# Patient Record
Sex: FEMALE | Race: WHITE | NOT HISPANIC OR LATINO
[De-identification: names, ages, dates, MRNs, and addresses within clinical notes are randomized per-mention and may not be internally consistent; named-entity substitution may affect disease eponyms.]

---

## 2024-05-03 ENCOUNTER — APPOINTMENT (OUTPATIENT)
Dept: OTOLARYNGOLOGY | Facility: CLINIC | Age: 44
End: 2024-05-03
Payer: COMMERCIAL

## 2024-05-03 VITALS
BODY MASS INDEX: 23.74 KG/M2 | WEIGHT: 134 LBS | DIASTOLIC BLOOD PRESSURE: 72 MMHG | OXYGEN SATURATION: 100 % | HEIGHT: 63 IN | SYSTOLIC BLOOD PRESSURE: 112 MMHG | HEART RATE: 90 BPM

## 2024-05-03 DIAGNOSIS — J34.89 OTHER SPECIFIED DISORDERS OF NOSE AND NASAL SINUSES: ICD-10-CM

## 2024-05-03 DIAGNOSIS — R59.0 LOCALIZED ENLARGED LYMPH NODES: ICD-10-CM

## 2024-05-03 DIAGNOSIS — J30.9 ALLERGIC RHINITIS, UNSPECIFIED: ICD-10-CM

## 2024-05-03 PROBLEM — Z00.00 ENCOUNTER FOR PREVENTIVE HEALTH EXAMINATION: Status: ACTIVE | Noted: 2024-05-03

## 2024-05-03 PROCEDURE — 31575 DIAGNOSTIC LARYNGOSCOPY: CPT

## 2024-05-03 PROCEDURE — 31231 NASAL ENDOSCOPY DX: CPT | Mod: 59

## 2024-05-03 PROCEDURE — 99204 OFFICE O/P NEW MOD 45 MIN: CPT | Mod: 25

## 2024-05-03 RX ORDER — FLUTICASONE PROPIONATE 50 UG/1
50 SPRAY, METERED NASAL DAILY
Qty: 3 | Refills: 1 | Status: ACTIVE | COMMUNITY
Start: 2024-05-03 | End: 1900-01-01

## 2024-05-03 NOTE — ASSESSMENT
[FreeTextEntry1] : - 5/3/24: 44 y/o F with history of Hashimoto's presents with concern for right pre-auricular lesion for the past 5 months. She reports it periodically increases and decreases in size. When she saw her internist in January she was advised to use Nettipot and take Advil and after treatment it decreased. She complains of discomfort which she would rate a 3 out of 10 in severity. On physical exam she was found to have a palpable lesion in right pre-auricular area. I am recommending ultrasound of head and neck and to follow up after in 1 month to review.   She also complains of intermittent rhinorrhea for the past 5 months. She has left nasal pain, but attributes this to acne. On physical exam she was found to have inflammation consistent with allergic rhinitis. I am recommending nasal saline rinses and nasal steroids.    -ultrasound head + neck -nasal saline rinses and nasal steroids -Followup after above in 1 month to reivew

## 2024-05-03 NOTE — PHYSICAL EXAM
[Normal] : mucosa is normal [Midline] : trachea located in midline position [de-identified] : r preauricular lymph node palpable- soft, mildly tender

## 2024-05-03 NOTE — PROCEDURE
[FreeTextEntry6] : -  Pre-operative Diagnosis: Rhinorrhea Post-operative Diagnosis: inflammation consistent with allergic rhinitis  Anesthesia: Topical Procedure: Bilateral nasal endoscopy Procedure Details:   After topical anesthesia and decongestant, the patient was placed in the supine position. The telescope was passed along the left nasal floor to the nasopharynx. It was then passed into the region of the middle meatus, middle turbinate, and the sphenoethmoid region. An identical procedure was performed on the right side.   Findings: Mucosa:   inflammation consistent with allergic rhinitis  Nasal septum: normal Discharge:  none Turbinates:  normal Adenoid:   normal Posterior choanae:  normal Eustachian tubes:  normal  Mucous stranding:  normal   Lesions:   Not present   Comments:   Condition: Stable. Patient tolerated procedure well. [de-identified] : -   Pre-operative Diagnosis: Head/ neck exam Post-operative Diagnosis: Normal  Anesthesia: Topical - 1 % Lidocaine/Phenylephrine Procedure: Flexible Laryngoscopy   Procedure Details: The patient was placed in the sitting position. After decongestant and anesthesia were applied the laryngoscope was passed. The nasal cavities, nasopharynx, oropharynx, hypopharynx, and larynx were all examined. Vocal folds were examined during respiration and phonation. The following findings were noted:   Findings: Nose: Septum is midline, turbinates are normal, nasal airways patent, mucosa normal Nasopharynx: Adenoids normal, no masses, eustachian tube normal Oropharynx: Pharyngeal walls symmetric and without lesion. Tonsils/fossae symmetric and normal. Hypopharynx: Hypopharynx and pyriform sinuses without lesion. No masses or asymmetry. No pooling of secretions. Larynx: Epiglottis and aryepiglottic folds were sharp and crisp bilaterally. Bilateral false and true vocal folds normal appearance. Bilateral vocal folds fully mobile and symmetric. Airway was widely patent.   Condition: Stable. Patient tolerated procedure well.   Complications: None

## 2024-05-03 NOTE — HISTORY OF PRESENT ILLNESS
[de-identified] : 5/3/24: 42 y/o F with history of Hashimoto's presents with concern for right pre-auricular lesion for the past 5 months. She reports it periodically increases and decreases in size. When she saw her internist in January, she was advised to use Netti pot and take Advil and after treatment it decreased. She complains of discomfort which she would rate a 3 out of 10 in severity. No fevers, chills, night sweats, or intentional weight loss. She denies hearing loss, tinnitus, otorrhea, vertiginous symptoms. +qtips. -bruxism  She also complains of intermittent rhinorrhea for the past 5 months. She has left nasal pain, but attributes this to acne. No changes in smell / taste or postnasal drip. For treatments she has used Netti Pot which she finds helpful.

## 2024-05-08 ENCOUNTER — APPOINTMENT (OUTPATIENT)
Dept: ULTRASOUND IMAGING | Facility: CLINIC | Age: 44
End: 2024-05-08

## 2024-05-10 ENCOUNTER — APPOINTMENT (OUTPATIENT)
Dept: ULTRASOUND IMAGING | Facility: CLINIC | Age: 44
End: 2024-05-10
Payer: COMMERCIAL

## 2024-05-10 ENCOUNTER — RESULT REVIEW (OUTPATIENT)
Age: 44
End: 2024-05-10

## 2024-05-10 ENCOUNTER — OUTPATIENT (OUTPATIENT)
Dept: OUTPATIENT SERVICES | Facility: HOSPITAL | Age: 44
LOS: 1 days | End: 2024-05-10

## 2024-05-10 PROCEDURE — 76536 US EXAM OF HEAD AND NECK: CPT | Mod: 26

## 2024-05-31 ENCOUNTER — APPOINTMENT (OUTPATIENT)
Dept: OTOLARYNGOLOGY | Facility: CLINIC | Age: 44
End: 2024-05-31

## 2024-05-31 NOTE — PROCEDURE
[FreeTextEntry6] : -  Pre-operative Diagnosis: Rhinorrhea Post-operative Diagnosis: inflammation consistent with allergic rhinitis  Anesthesia: Topical Procedure: Bilateral nasal endoscopy Procedure Details:   After topical anesthesia and decongestant, the patient was placed in the supine position. The telescope was passed along the left nasal floor to the nasopharynx. It was then passed into the region of the middle meatus, middle turbinate, and the sphenoethmoid region. An identical procedure was performed on the right side.   Findings: Mucosa:   inflammation consistent with allergic rhinitis  Nasal septum: normal Discharge:  none Turbinates:  normal Adenoid:   normal Posterior choanae:  normal Eustachian tubes:  normal  Mucous stranding:  normal   Lesions:   Not present   Comments:   Condition: Stable. Patient tolerated procedure well. [de-identified] : -   Pre-operative Diagnosis: Head/ neck exam Post-operative Diagnosis: Normal  Anesthesia: Topical - 1 % Lidocaine/Phenylephrine Procedure: Flexible Laryngoscopy   Procedure Details: The patient was placed in the sitting position. After decongestant and anesthesia were applied the laryngoscope was passed. The nasal cavities, nasopharynx, oropharynx, hypopharynx, and larynx were all examined. Vocal folds were examined during respiration and phonation. The following findings were noted:   Findings: Nose: Septum is midline, turbinates are normal, nasal airways patent, mucosa normal Nasopharynx: Adenoids normal, no masses, eustachian tube normal Oropharynx: Pharyngeal walls symmetric and without lesion. Tonsils/fossae symmetric and normal. Hypopharynx: Hypopharynx and pyriform sinuses without lesion. No masses or asymmetry. No pooling of secretions. Larynx: Epiglottis and aryepiglottic folds were sharp and crisp bilaterally. Bilateral false and true vocal folds normal appearance. Bilateral vocal folds fully mobile and symmetric. Airway was widely patent.   Condition: Stable. Patient tolerated procedure well.   Complications: None

## 2024-05-31 NOTE — ASSESSMENT
[FreeTextEntry1] : - 5/3/24: 44 y/o F with history of Hashimoto's presents with concern for right pre-auricular lesion for the past 5 months. She reports it periodically increases and decreases in size. When she saw her internist in January she was advised to use Nettipot and take Advil and after treatment it decreased. She complains of discomfort which she would rate a 3 out of 10 in severity. On physical exam she was found to have a palpable lesion in right pre-auricular area. I am recommending ultrasound of head and neck and to follow up after in 1 month to review.   She also complains of intermittent rhinorrhea for the past 5 months. She has left nasal pain, but attributes this to acne. On physical exam she was found to have inflammation consistent with allergic rhinitis. I am recommending nasal saline rinses and nasal steroids.  5/31/24: Ultrasound head and neck consistent with normal -appearing lymph node.     -ultrasound head + neck -nasal saline rinses and nasal steroids -Followup after above in 1 month to reivew

## 2024-05-31 NOTE — HISTORY OF PRESENT ILLNESS
[de-identified] : 5/3/24: 44 y/o F with history of Hashimoto's presents with concern for right pre-auricular lesion for the past 5 months. She reports it periodically increases and decreases in size. When she saw her internist in January, she was advised to use Netti pot and take Advil and after treatment it decreased. She complains of discomfort which she would rate a 3 out of 10 in severity. No fevers, chills, night sweats, or intentional weight loss. She denies hearing loss, tinnitus, otorrhea, vertiginous symptoms. +qtips. -bruxism  She also complains of intermittent rhinorrhea for the past 5 months. She has left nasal pain, but attributes this to acne. No changes in smell / taste or postnasal drip. For treatments she has used Netti Pot which she finds helpful.  - [FreeTextEntry1] : 5/31/24: Patient completed US head and neck and presents to review those results.

## 2024-07-19 ENCOUNTER — APPOINTMENT (OUTPATIENT)
Dept: OTOLARYNGOLOGY | Facility: CLINIC | Age: 44
End: 2024-07-19

## 2024-10-11 ENCOUNTER — APPOINTMENT (OUTPATIENT)
Dept: OTOLARYNGOLOGY | Facility: CLINIC | Age: 44
End: 2024-10-11
Payer: COMMERCIAL

## 2024-10-11 VITALS
SYSTOLIC BLOOD PRESSURE: 102 MMHG | HEART RATE: 80 BPM | OXYGEN SATURATION: 98 % | WEIGHT: 135 LBS | TEMPERATURE: 96.7 F | BODY MASS INDEX: 23.92 KG/M2 | HEIGHT: 63 IN | DIASTOLIC BLOOD PRESSURE: 69 MMHG

## 2024-10-11 DIAGNOSIS — K21.9 GASTRO-ESOPHAGEAL REFLUX DISEASE W/OUT ESOPHAGITIS: ICD-10-CM

## 2024-10-11 DIAGNOSIS — J30.9 ALLERGIC RHINITIS, UNSPECIFIED: ICD-10-CM

## 2024-10-11 DIAGNOSIS — R59.0 LOCALIZED ENLARGED LYMPH NODES: ICD-10-CM

## 2024-10-11 DIAGNOSIS — J34.89 OTHER SPECIFIED DISORDERS OF NOSE AND NASAL SINUSES: ICD-10-CM

## 2024-10-11 DIAGNOSIS — K11.7 DISTURBANCES OF SALIVARY SECRETION: ICD-10-CM

## 2024-10-11 PROCEDURE — 99215 OFFICE O/P EST HI 40 MIN: CPT | Mod: 25

## 2024-10-11 PROCEDURE — 31575 DIAGNOSTIC LARYNGOSCOPY: CPT
